# Patient Record
Sex: FEMALE | Race: OTHER
[De-identification: names, ages, dates, MRNs, and addresses within clinical notes are randomized per-mention and may not be internally consistent; named-entity substitution may affect disease eponyms.]

---

## 2021-01-21 ENCOUNTER — HOSPITAL ENCOUNTER (EMERGENCY)
Dept: HOSPITAL 72 - EMR | Age: 56
Discharge: HOME | End: 2021-01-21
Payer: MEDICAID

## 2021-01-21 VITALS — HEIGHT: 61 IN | BODY MASS INDEX: 28.32 KG/M2 | WEIGHT: 150 LBS

## 2021-01-21 VITALS — SYSTOLIC BLOOD PRESSURE: 95 MMHG | DIASTOLIC BLOOD PRESSURE: 59 MMHG

## 2021-01-21 VITALS — DIASTOLIC BLOOD PRESSURE: 62 MMHG | SYSTOLIC BLOOD PRESSURE: 101 MMHG

## 2021-01-21 DIAGNOSIS — R06.00: Primary | ICD-10-CM

## 2021-01-21 PROCEDURE — 99283 EMERGENCY DEPT VISIT LOW MDM: CPT

## 2021-01-21 PROCEDURE — 71045 X-RAY EXAM CHEST 1 VIEW: CPT

## 2021-01-21 NOTE — NUR
ED Nurse Note:



PAtient walked into the ED with c/o SOB onset 2 weeks ago. Patient stated s/sx 
is worse when lying down and only at night. Triage O2 sat 97% RA. Patient 
denies CP, fever/chills, n/v/d. Pt denies any contact with sick people. Patient 
is AAOx4 and ambulatory. VSS as documented

## 2021-01-21 NOTE — EMERGENCY ROOM REPORT
History of Present Illness


General


Chief Complaint:  To Be Triaged


Source:  Patient





Present Illness


[facility]


This is a 55-year-old female with no past medical history.  She presents with 

chief complaint of shortness of breath.  Onset for the last 2 weeks.  Only at 

night.  She said it is worse when she lays down.  No fever chills but no nausea 

no vomiting.  No coughing.  No chest pain.  Better with sitting up.  No sick 

contact.  She has similar symptom in December.


Allergies:  


Coded Allergies:  


     No Known Allergies (Unverified , 1/21/21)





Patient History


Past Medical History:  see triage record, old chart reviewed


Past Surgical History:  none


Pertinent Family History:  none


Social History:  Denies: smoking


Pregnant Now:  No


Immunizations:  other


Reviewed Nursing Documentation:  PMH: Agreed; PSxH: Agreed





Review of Systems


Eye:  Denies: eye pain, blurred vision


ENT:  Denies: ear pain, nose congestion, throat swelling


Respiratory:  Reports: shortness of breath; Denies: cough


Cardiovascular:  Denies: chest pain, palpitations


Gastrointestinal:  Denies: abdominal pain, diarrhea, nausea, vomiting


Musculoskeletal:  Denies: back pain, joint pain


Skin:  Denies: rash


Neurological:  Denies: headache, numbness


Endocrine:  Denies: increased thirst, increased urine


Hematologic/Lymphatic:  Denies: easy bruising


All Other Systems:  negative except mentioned in HPI





Physical Exam


Is unremarkable


Sp02 EP Interpretation:  reviewed, normal


General Appearance:  well appearing, no apparent distress, alert, obese


Head:  normocephalic, atraumatic


Eyes:  bilateral eye PERRL, bilateral eye EOMI


ENT:  hearing grossly normal, normal pharynx


Neck:  full range of motion, supple, no meningismus


Respiratory:  chest non-tender, lungs clear, normal breath sounds


Cardiovascular #1:  regular rate, rhythm, no murmur


Gastrointestinal:  normal bowel sounds, non tender, no mass, no organomegaly, no

bruit, non-distended


Musculoskeletal:  back normal, normal range of motion, gait/station normal


Psychiatric:  mood/affect normal





Medical Decision Making


Diagnostic Impression:  


   Primary Impression:  


   Dyspnea


   Qualified Codes:  R06.00 - Dyspnea, unspecified


ER Course


This patient presents with only dyspnea when laying down.  This may be secondary

to reflux or sleep apnea based on her body habitus.  She is otherwise 

comfortable.  No evidence of pneumonia, ACS, PE, dissection to name a few.  

Negative for Covid.  Will discharge home.


Chest X-Ray Diagnostic Results


Chest X-Ray Diagnostic Results :  


   Chest X-Ray Ordered:  Yes


   # of Views/Limited/Complete:  1 View


   Indication:  Shortness of Breath


   EP Interpretation:  Yes


   Interpretation:  no consolidation, no effusion, no pneumothorax, no acute c

ardiopulmonary disease


   Impression:  No acute disease


   Electronically Signed by:  Rashad Baer MD


Status:  improved


Disposition:  HOME, SELF-CARE


Condition:  Stable


Scripts


Albuterol Sulfate (VENTOLIN HFA) 18 Gm [email or web address]


2 PUFFS INH EVERY 6 HOURS, #18 GM 0 Refills


   Prov: Rashad Baer MD         1/21/21





Additional Instructions:  


Follow-up with your doctor in 7 days.  You may need a sleep study to rule out 

obstructive sleep apnea.  Return if symptoms worsen.











Rashad Baer MD                  Jan 21, 2021 22:20

## 2021-01-22 NOTE — DIAGNOSTIC IMAGING REPORT
Procedure: XRAY Chest 1v

Reason for study: Reason For Exam: SOB

 

Comparison films:  None.

 

FINDINGS:

 

A single one view chest is obtained. Vascularity is normal. The lung fields are clear

bilaterally. Cardiac and mediastinal silhouette are within normal limits. CP angles

are sharp.  The bony thorax appear unremarkable.

 

IMPRESSION:  

 

NO ACUTE CARDIOPULMONARY DISEASE.